# Patient Record
(demographics unavailable — no encounter records)

---

## 2024-11-22 NOTE — PHYSICAL EXAM
[de-identified] : Neurologic: normal sensation, normal mood and affect, orientated and able to communicate  Left Knee: ROM0-135 crepitus  Medial joint line tenderness Hypermobile patella  Medial joint line tenderness Medial facet of patella tenderness Medial Claire's test positive

## 2024-11-22 NOTE — DATA REVIEWED
[FreeTextEntry1] : 01/16/24 [facility] X-Ray Examination of the LEFT KNEE: 4 views: Severe patella femoral OA, patella zenaida  1/19/24 [facility] MRI Left Knee This scan was reviewed and interpreted by Dr. Gtz, and his findings are- Impression: 1. 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 2. Chronic MCL sprain with laxity, extensor mechanism tendinopathy and mild prepatella soft tissue swelling with effusion, synovitis and multiple plicae. 3. No acute fracture.

## 2024-11-22 NOTE — DISCUSSION/SUMMARY
[de-identified] : Pain not improved with HA administered at last visit. Discussed all treatment options with the patient, patient would to follow through with csi injection today. Patient will follow up as needed.  **will consider surgery(cleanout) if pain not improved.  RB&A to corticosteroid injection discussed. All questions were answered. Patient wishes to move forward with injection today.   Left Knee Ultrasound Guided aspiration/steroid injection procedure note: Patient Identification Name/: Verbal with patient and/or family   Procedure Verification: Procedure confirmed with patient or family/designee Consent for procedure: Verbal Consent Given Relevant documentation completed, reviewed, and signed Clinical indications for procedure confirmed  Time-out with all members of procedure team immediately prior to procedure: Correct patient identified. Agreement on procedure. Correct side and site.  KNEE INTRAARTICULAR INJECTION - LEFT After verbal consent and identification of the correct patient and correct site, the LEFT superolateral knee was prepped using alcohol swabs and betadine. This was allowed time to air dry.  A mixture of Kenalog,  Bupicacaine  was injected into the left knee using a sterile 22G 1.5 inch needle.  The patient tolerated the procedure well.  A sterile dressing was placed.  After-care instructions were provided and included instructions to ice the area and to call if redness, pain, or fever develop.  ----------------------------------------------- Home Exercise The patient is instructed on a home exercise program.  MIESHA KINGSLEY Acting as a Scribe for Dr. Ulisses MAHMOOD, Miesha Kingsley, attest that this documentation has been prepared under the direction and in the presence of Provider Henri Gtz MD.  Activity Modification The patient was advised to modify their activities.  Dx / Natural History The patient was advised of the diagnosis.  The natural history of the pathology was explained in full to the patient in layman's terms.  Several different treatment options were discussed and explained in full to the patient including the risks and benefits of both surgical and non-surgical treatments.  All questions and concerns were answered.  Pain Guide Activities The patient was advised to let pain guide the gradual advancement of activities.  TALIB MAHMOOD explained to the patient that rest, ice, compression, and elevation would benefit them.  They may return to activity after follow-up or when they no longer have any pain.  The patient's current medication management of their orthopedic diagnosis was reviewed today: (1) We discussed a comprehensive treatment plan that included possible pharmaceutical management involving the use of prescription strength medications including but not limited to options such as oral Naprosyn 500mg BID, once daily Meloxicam 15 mg, or 500-650 mg Tylenol versus over the counter oral medications and topical prescription NSAID Pennsaid vs over the counter Voltaren gel. (2) There is a moderate risk of morbidity with further treatment, especially from use of prescription strength medications and possible side effects of these medications which include upset stomach with oral medications, skin reactions to topical medications and cardiac/renal issues with long term use. (3) I recommended that the patient follow-up with their medical physician to discuss any significant specific potential issues with long term medication use such as interactions with current medications or with exacerbation of underlying medical comorbidities. (4) The benefits and risks associated with use of injectable, oral or topical, prescription and over the counter anti-inflammatory medications were discussed with the patient. The patient voiced understanding of the risks including but not limited to bleeding, stroke, kidney dysfunction, heart disease, and were referred to the black box warning label for further information.

## 2024-11-22 NOTE — PHYSICAL EXAM
[de-identified] : Neurologic: normal sensation, normal mood and affect, orientated and able to communicate  Left Knee: ROM0-135 crepitus  Medial joint line tenderness Hypermobile patella  Medial joint line tenderness Medial facet of patella tenderness Medial Claire's test positive

## 2024-11-22 NOTE — DISCUSSION/SUMMARY
[de-identified] : Pain not improved with HA administered at last visit. Discussed all treatment options with the patient, patient would to follow through with csi injection today. Patient will follow up as needed.  **will consider surgery(cleanout) if pain not improved.  RB&A to corticosteroid injection discussed. All questions were answered. Patient wishes to move forward with injection today.   Left Knee Ultrasound Guided aspiration/steroid injection procedure note: Patient Identification Name/: Verbal with patient and/or family   Procedure Verification: Procedure confirmed with patient or family/designee Consent for procedure: Verbal Consent Given Relevant documentation completed, reviewed, and signed Clinical indications for procedure confirmed  Time-out with all members of procedure team immediately prior to procedure: Correct patient identified. Agreement on procedure. Correct side and site.  KNEE INTRAARTICULAR INJECTION - LEFT After verbal consent and identification of the correct patient and correct site, the LEFT superolateral knee was prepped using alcohol swabs and betadine. This was allowed time to air dry.  A mixture of Kenalog,  Bupicacaine  was injected into the left knee using a sterile 22G 1.5 inch needle.  The patient tolerated the procedure well.  A sterile dressing was placed.  After-care instructions were provided and included instructions to ice the area and to call if redness, pain, or fever develop.  ----------------------------------------------- Home Exercise The patient is instructed on a home exercise program.  MIESHA KINGSLEY Acting as a Scribe for Dr. Ulisses MAHMOOD, Miesha Kingsley, attest that this documentation has been prepared under the direction and in the presence of Provider Henri Gtz MD.  Activity Modification The patient was advised to modify their activities.  Dx / Natural History The patient was advised of the diagnosis.  The natural history of the pathology was explained in full to the patient in layman's terms.  Several different treatment options were discussed and explained in full to the patient including the risks and benefits of both surgical and non-surgical treatments.  All questions and concerns were answered.  Pain Guide Activities The patient was advised to let pain guide the gradual advancement of activities.  TALIB MAHMOOD explained to the patient that rest, ice, compression, and elevation would benefit them.  They may return to activity after follow-up or when they no longer have any pain.  The patient's current medication management of their orthopedic diagnosis was reviewed today: (1) We discussed a comprehensive treatment plan that included possible pharmaceutical management involving the use of prescription strength medications including but not limited to options such as oral Naprosyn 500mg BID, once daily Meloxicam 15 mg, or 500-650 mg Tylenol versus over the counter oral medications and topical prescription NSAID Pennsaid vs over the counter Voltaren gel. (2) There is a moderate risk of morbidity with further treatment, especially from use of prescription strength medications and possible side effects of these medications which include upset stomach with oral medications, skin reactions to topical medications and cardiac/renal issues with long term use. (3) I recommended that the patient follow-up with their medical physician to discuss any significant specific potential issues with long term medication use such as interactions with current medications or with exacerbation of underlying medical comorbidities. (4) The benefits and risks associated with use of injectable, oral or topical, prescription and over the counter anti-inflammatory medications were discussed with the patient. The patient voiced understanding of the risks including but not limited to bleeding, stroke, kidney dysfunction, heart disease, and were referred to the black box warning label for further information.

## 2024-11-22 NOTE — HISTORY OF PRESENT ILLNESS
[de-identified] : The patient is a 58 year old [RIGHT] hand dominant male who presents today complaining of LT knee pain.   Date of Injury/Onset: 8 months ago  Pain:    At Rest: 9/10  With Activity:  9/10  Mechanism of injury: NKI, hx of TKA in the right knee 2 years ago  This is NOT a Work Related Injury being treated under Worker's Compensation. This is NOT an athletic injury occurring associated with an interscholastic or organized sports team. Quality of symptoms: sharp, throbbing, aching, stabbing  Improves with: activity/ walking Worse with: prolonged sitting/ rest  Treatment/Imaging/Studies Since Last Visit: gel injection 2/9/24 Reports Available For Review Today: MRI [facility] Out of work/sport: retired School/Sport/Position/Occupation: retired  Change since last visit: patient reports carrying something down basement steps 2 weeks ago and tripped down stairs., Reports inc in pain of LT knee since incident. reports pain is worst at night, very TTP over medial joint line Additional Information: None

## 2024-11-22 NOTE — HISTORY OF PRESENT ILLNESS
[de-identified] : The patient is a 58 year old [RIGHT] hand dominant male who presents today complaining of LT knee pain.   Date of Injury/Onset: 8 months ago  Pain:    At Rest: 9/10  With Activity:  9/10  Mechanism of injury: NKI, hx of TKA in the right knee 2 years ago  This is NOT a Work Related Injury being treated under Worker's Compensation. This is NOT an athletic injury occurring associated with an interscholastic or organized sports team. Quality of symptoms: sharp, throbbing, aching, stabbing  Improves with: activity/ walking Worse with: prolonged sitting/ rest  Treatment/Imaging/Studies Since Last Visit: gel injection 2/9/24 Reports Available For Review Today: MRI [facility] Out of work/sport: retired School/Sport/Position/Occupation: retired  Change since last visit: patient reports carrying something down basement steps 2 weeks ago and tripped down stairs., Reports inc in pain of LT knee since incident. reports pain is worst at night, very TTP over medial joint line Additional Information: None